# Patient Record
Sex: FEMALE | Race: BLACK OR AFRICAN AMERICAN | NOT HISPANIC OR LATINO
[De-identification: names, ages, dates, MRNs, and addresses within clinical notes are randomized per-mention and may not be internally consistent; named-entity substitution may affect disease eponyms.]

---

## 2017-02-02 ENCOUNTER — APPOINTMENT (OUTPATIENT)
Dept: CHRONIC DISEASE MANAGEMENT | Facility: CLINIC | Age: 52
End: 2017-02-02

## 2017-02-02 ENCOUNTER — RESULT CHARGE (OUTPATIENT)
Age: 52
End: 2017-02-02

## 2017-02-02 LAB — HBA1C MFR BLD HPLC: 13.9

## 2017-03-09 ENCOUNTER — APPOINTMENT (OUTPATIENT)
Dept: CHRONIC DISEASE MANAGEMENT | Facility: CLINIC | Age: 52
End: 2017-03-09

## 2017-05-11 ENCOUNTER — APPOINTMENT (OUTPATIENT)
Dept: ENDOCRINOLOGY | Facility: CLINIC | Age: 52
End: 2017-05-11

## 2017-05-11 VITALS
WEIGHT: 178 LBS | SYSTOLIC BLOOD PRESSURE: 110 MMHG | HEART RATE: 82 BPM | DIASTOLIC BLOOD PRESSURE: 70 MMHG | BODY MASS INDEX: 33.61 KG/M2 | OXYGEN SATURATION: 98 % | HEIGHT: 61 IN

## 2017-05-11 LAB
GLUCOSE BLDC GLUCOMTR-MCNC: 249
HBA1C MFR BLD HPLC: 12.9

## 2017-05-11 RX ORDER — LANCETS 33 GAUGE
EACH MISCELLANEOUS
Qty: 1 | Refills: 2 | Status: DISCONTINUED | COMMUNITY
Start: 2017-02-02 | End: 2017-05-11

## 2017-05-11 RX ORDER — BLOOD SUGAR DIAGNOSTIC
STRIP MISCELLANEOUS TWICE DAILY
Qty: 1 | Refills: 2 | Status: DISCONTINUED | COMMUNITY
Start: 2017-02-02 | End: 2017-05-11

## 2017-05-11 RX ORDER — ISOPROPYL ALCOHOL 0.7 ML/ML
SWAB TOPICAL
Qty: 2 | Refills: 3 | Status: ACTIVE | COMMUNITY
Start: 2017-05-11 | End: 1900-01-01

## 2017-05-11 RX ORDER — ASPIRIN ENTERIC COATED TABLETS 81 MG 81 MG/1
81 TABLET, DELAYED RELEASE ORAL
Qty: 1 | Refills: 5 | Status: ACTIVE | COMMUNITY
Start: 2017-05-11 | End: 1900-01-01

## 2017-05-12 RX ORDER — BLOOD SUGAR DIAGNOSTIC
STRIP MISCELLANEOUS 4 TIMES DAILY
Qty: 2 | Refills: 5 | Status: DISCONTINUED | COMMUNITY
Start: 2017-05-11 | End: 2017-05-12

## 2017-05-12 RX ORDER — LANCETS 28 GAUGE
EACH MISCELLANEOUS
Qty: 2 | Refills: 2 | Status: DISCONTINUED | COMMUNITY
Start: 2017-05-11 | End: 2017-05-12

## 2017-05-12 RX ORDER — BLOOD-GLUCOSE METER
KIT MISCELLANEOUS
Qty: 1 | Refills: 0 | Status: DISCONTINUED | COMMUNITY
Start: 2017-05-11 | End: 2017-05-12

## 2017-05-12 RX ORDER — LANCETS
EACH MISCELLANEOUS
Qty: 3 | Refills: 3 | Status: ACTIVE | COMMUNITY
Start: 2017-05-12 | End: 1900-01-01

## 2017-05-12 RX ORDER — BLOOD-GLUCOSE METER
EACH MISCELLANEOUS
Qty: 1 | Refills: 0 | Status: ACTIVE | COMMUNITY
Start: 2017-05-12 | End: 1900-01-01

## 2017-05-26 ENCOUNTER — APPOINTMENT (OUTPATIENT)
Dept: MAMMOGRAPHY | Facility: IMAGING CENTER | Age: 52
End: 2017-05-26

## 2017-05-26 ENCOUNTER — APPOINTMENT (OUTPATIENT)
Dept: ULTRASOUND IMAGING | Facility: IMAGING CENTER | Age: 52
End: 2017-05-26

## 2017-05-26 ENCOUNTER — OUTPATIENT (OUTPATIENT)
Dept: OUTPATIENT SERVICES | Facility: HOSPITAL | Age: 52
LOS: 1 days | End: 2017-05-26
Payer: MEDICAID

## 2017-05-26 DIAGNOSIS — Z00.8 ENCOUNTER FOR OTHER GENERAL EXAMINATION: ICD-10-CM

## 2017-05-26 PROCEDURE — 76642 ULTRASOUND BREAST LIMITED: CPT

## 2017-05-26 PROCEDURE — G0279: CPT

## 2017-05-26 PROCEDURE — 77065 DX MAMMO INCL CAD UNI: CPT

## 2017-06-12 ENCOUNTER — APPOINTMENT (OUTPATIENT)
Dept: ENDOCRINOLOGY | Facility: CLINIC | Age: 52
End: 2017-06-12

## 2017-06-12 VITALS
DIASTOLIC BLOOD PRESSURE: 70 MMHG | OXYGEN SATURATION: 98 % | HEIGHT: 61 IN | WEIGHT: 171 LBS | SYSTOLIC BLOOD PRESSURE: 100 MMHG | BODY MASS INDEX: 32.28 KG/M2 | HEART RATE: 95 BPM

## 2017-06-13 ENCOUNTER — APPOINTMENT (OUTPATIENT)
Dept: ULTRASOUND IMAGING | Facility: IMAGING CENTER | Age: 52
End: 2017-06-13

## 2017-06-13 ENCOUNTER — OUTPATIENT (OUTPATIENT)
Dept: OUTPATIENT SERVICES | Facility: HOSPITAL | Age: 52
LOS: 1 days | End: 2017-06-13
Payer: MEDICAID

## 2017-06-13 ENCOUNTER — RESULT REVIEW (OUTPATIENT)
Age: 52
End: 2017-06-13

## 2017-06-13 DIAGNOSIS — Z00.8 ENCOUNTER FOR OTHER GENERAL EXAMINATION: ICD-10-CM

## 2017-06-13 LAB
CHOLEST SERPL-MCNC: 192 MG/DL
CHOLEST/HDLC SERPL: 3 RATIO
HDLC SERPL-MCNC: 64 MG/DL
LDLC SERPL CALC-MCNC: 108 MG/DL
TRIGL SERPL-MCNC: 98 MG/DL

## 2017-06-13 PROCEDURE — 88360 TUMOR IMMUNOHISTOCHEM/MANUAL: CPT | Mod: 26

## 2017-06-13 PROCEDURE — 88341 IMHCHEM/IMCYTCHM EA ADD ANTB: CPT

## 2017-06-13 PROCEDURE — 88305 TISSUE EXAM BY PATHOLOGIST: CPT

## 2017-06-13 PROCEDURE — 77065 DX MAMMO INCL CAD UNI: CPT

## 2017-06-13 PROCEDURE — 88341 IMHCHEM/IMCYTCHM EA ADD ANTB: CPT | Mod: 26,59

## 2017-06-13 PROCEDURE — G0206: CPT | Mod: 26,LT

## 2017-06-13 PROCEDURE — 88342 IMHCHEM/IMCYTCHM 1ST ANTB: CPT | Mod: 26,59

## 2017-06-13 PROCEDURE — 19083 BX BREAST 1ST LESION US IMAG: CPT | Mod: LT

## 2017-06-13 PROCEDURE — 88305 TISSUE EXAM BY PATHOLOGIST: CPT | Mod: 26

## 2017-06-13 PROCEDURE — A4648: CPT

## 2017-06-13 PROCEDURE — 88342 IMHCHEM/IMCYTCHM 1ST ANTB: CPT

## 2017-06-13 PROCEDURE — 19083 BX BREAST 1ST LESION US IMAG: CPT

## 2017-06-13 PROCEDURE — 88360 TUMOR IMMUNOHISTOCHEM/MANUAL: CPT

## 2017-06-13 RX ORDER — METFORMIN HYDROCHLORIDE 500 MG/1
500 TABLET, FILM COATED, EXTENDED RELEASE ORAL
Qty: 60 | Refills: 5 | Status: DISCONTINUED | COMMUNITY
Start: 2017-06-12 | End: 2017-06-13

## 2017-06-16 LAB — SURGICAL PATHOLOGY STUDY: SIGNIFICANT CHANGE UP

## 2017-07-06 ENCOUNTER — APPOINTMENT (OUTPATIENT)
Dept: OBGYN | Facility: CLINIC | Age: 52
End: 2017-07-06

## 2017-08-08 ENCOUNTER — OUTPATIENT (OUTPATIENT)
Dept: OUTPATIENT SERVICES | Facility: HOSPITAL | Age: 52
LOS: 1 days | End: 2017-08-08
Payer: MEDICAID

## 2017-08-08 ENCOUNTER — APPOINTMENT (OUTPATIENT)
Dept: ULTRASOUND IMAGING | Facility: IMAGING CENTER | Age: 52
End: 2017-08-08
Payer: MEDICAID

## 2017-08-08 DIAGNOSIS — Z00.00 ENCOUNTER FOR GENERAL ADULT MEDICAL EXAMINATION WITHOUT ABNORMAL FINDINGS: ICD-10-CM

## 2017-08-08 PROCEDURE — 76536 US EXAM OF HEAD AND NECK: CPT | Mod: 26

## 2017-08-08 PROCEDURE — 76536 US EXAM OF HEAD AND NECK: CPT

## 2017-08-14 ENCOUNTER — APPOINTMENT (OUTPATIENT)
Dept: ENDOCRINOLOGY | Facility: CLINIC | Age: 52
End: 2017-08-14
Payer: MEDICAID

## 2017-08-14 VITALS
DIASTOLIC BLOOD PRESSURE: 86 MMHG | HEIGHT: 61 IN | WEIGHT: 179 LBS | SYSTOLIC BLOOD PRESSURE: 118 MMHG | HEART RATE: 96 BPM | OXYGEN SATURATION: 98 % | BODY MASS INDEX: 33.79 KG/M2

## 2017-08-14 LAB — GLUCOSE BLDC GLUCOMTR-MCNC: 232

## 2017-08-14 PROCEDURE — 99215 OFFICE O/P EST HI 40 MIN: CPT | Mod: 25

## 2017-08-14 PROCEDURE — 82962 GLUCOSE BLOOD TEST: CPT

## 2017-08-14 RX ORDER — DULAGLUTIDE 1.5 MG/.5ML
1.5 INJECTION, SOLUTION SUBCUTANEOUS
Qty: 1 | Refills: 3 | Status: ACTIVE | COMMUNITY
Start: 2017-06-12 | End: 1900-01-01

## 2017-08-17 LAB
CREAT SPEC-SCNC: 133 MG/DL
MICROALBUMIN 24H UR DL<=1MG/L-MCNC: 38.5 MG/DL
MICROALBUMIN/CREAT 24H UR-RTO: 289 MG/G
T4 FREE SERPL-MCNC: 1.4 NG/DL
TSH SERPL-ACNC: 1.26 UIU/ML

## 2017-08-25 ENCOUNTER — APPOINTMENT (OUTPATIENT)
Dept: OPHTHALMOLOGY | Facility: CLINIC | Age: 52
End: 2017-08-25
Payer: MEDICAID

## 2017-08-25 DIAGNOSIS — E11.9 TYPE 2 DIABETES MELLITUS W/OUT COMPLICATIONS: ICD-10-CM

## 2017-08-25 DIAGNOSIS — H25.13 AGE-RELATED NUCLEAR CATARACT, BILATERAL: ICD-10-CM

## 2017-08-25 PROCEDURE — 92004 COMPRE OPH EXAM NEW PT 1/>: CPT

## 2017-08-29 PROBLEM — H25.13 CATARACT, NUCLEAR, BILATERAL: Status: ACTIVE | Noted: 2017-08-29

## 2017-08-29 PROBLEM — E11.9 DM TYPE 2 WITHOUT RETINOPATHY: Status: ACTIVE | Noted: 2017-08-29

## 2017-09-01 ENCOUNTER — APPOINTMENT (OUTPATIENT)
Dept: PODIATRY | Facility: HOSPITAL | Age: 52
End: 2017-09-01

## 2017-12-01 ENCOUNTER — OUTPATIENT (OUTPATIENT)
Dept: OUTPATIENT SERVICES | Facility: HOSPITAL | Age: 52
LOS: 1 days | End: 2017-12-01
Payer: MEDICAID

## 2017-12-01 ENCOUNTER — APPOINTMENT (OUTPATIENT)
Dept: MAMMOGRAPHY | Facility: IMAGING CENTER | Age: 52
End: 2017-12-01
Payer: MEDICAID

## 2017-12-01 DIAGNOSIS — Z00.8 ENCOUNTER FOR OTHER GENERAL EXAMINATION: ICD-10-CM

## 2017-12-01 PROCEDURE — 77067 SCR MAMMO BI INCL CAD: CPT

## 2017-12-01 PROCEDURE — 77063 BREAST TOMOSYNTHESIS BI: CPT

## 2017-12-01 PROCEDURE — G0202: CPT | Mod: 26

## 2017-12-01 PROCEDURE — 77063 BREAST TOMOSYNTHESIS BI: CPT | Mod: 26

## 2017-12-08 ENCOUNTER — APPOINTMENT (OUTPATIENT)
Dept: MAMMOGRAPHY | Facility: IMAGING CENTER | Age: 52
End: 2017-12-08
Payer: MEDICAID

## 2017-12-08 ENCOUNTER — OUTPATIENT (OUTPATIENT)
Dept: OUTPATIENT SERVICES | Facility: HOSPITAL | Age: 52
LOS: 1 days | End: 2017-12-08
Payer: MEDICAID

## 2017-12-08 ENCOUNTER — APPOINTMENT (OUTPATIENT)
Dept: ULTRASOUND IMAGING | Facility: IMAGING CENTER | Age: 52
End: 2017-12-08
Payer: MEDICAID

## 2017-12-08 DIAGNOSIS — Z00.8 ENCOUNTER FOR OTHER GENERAL EXAMINATION: ICD-10-CM

## 2017-12-08 PROCEDURE — 76642 ULTRASOUND BREAST LIMITED: CPT | Mod: 26,LT

## 2017-12-08 PROCEDURE — G0206: CPT | Mod: 26,LT

## 2017-12-08 PROCEDURE — 77065 DX MAMMO INCL CAD UNI: CPT

## 2017-12-08 PROCEDURE — G0279: CPT | Mod: 26

## 2017-12-08 PROCEDURE — 76642 ULTRASOUND BREAST LIMITED: CPT

## 2017-12-08 PROCEDURE — G0279: CPT

## 2017-12-11 ENCOUNTER — APPOINTMENT (OUTPATIENT)
Dept: ENDOCRINOLOGY | Facility: CLINIC | Age: 52
End: 2017-12-11

## 2017-12-12 ENCOUNTER — RX RENEWAL (OUTPATIENT)
Age: 52
End: 2017-12-12

## 2017-12-13 ENCOUNTER — RX RENEWAL (OUTPATIENT)
Age: 52
End: 2017-12-13

## 2017-12-14 ENCOUNTER — RX RENEWAL (OUTPATIENT)
Age: 52
End: 2017-12-14

## 2017-12-18 ENCOUNTER — OUTPATIENT (OUTPATIENT)
Dept: OUTPATIENT SERVICES | Facility: HOSPITAL | Age: 52
LOS: 1 days | End: 2017-12-18
Payer: MEDICAID

## 2017-12-18 ENCOUNTER — RESULT REVIEW (OUTPATIENT)
Age: 52
End: 2017-12-18

## 2017-12-18 ENCOUNTER — APPOINTMENT (OUTPATIENT)
Dept: MAMMOGRAPHY | Facility: IMAGING CENTER | Age: 52
End: 2017-12-18
Payer: MEDICAID

## 2017-12-18 DIAGNOSIS — Z00.8 ENCOUNTER FOR OTHER GENERAL EXAMINATION: ICD-10-CM

## 2017-12-18 PROCEDURE — 77065 DX MAMMO INCL CAD UNI: CPT

## 2017-12-18 PROCEDURE — 19081 BX BREAST 1ST LESION STRTCTC: CPT | Mod: LT

## 2017-12-18 PROCEDURE — A4648: CPT

## 2017-12-18 PROCEDURE — G0206: CPT | Mod: 26,LT

## 2017-12-18 PROCEDURE — 19081 BX BREAST 1ST LESION STRTCTC: CPT

## 2017-12-21 DIAGNOSIS — N63.23 UNSPECIFIED LUMP IN THE LEFT BREAST, LOWER OUTER QUADRANT: ICD-10-CM

## 2017-12-21 DIAGNOSIS — R92.8 OTHER ABNORMAL AND INCONCLUSIVE FINDINGS ON DIAGNOSTIC IMAGING OF BREAST: ICD-10-CM

## 2017-12-31 ENCOUNTER — EMERGENCY (EMERGENCY)
Facility: HOSPITAL | Age: 52
LOS: 1 days | Discharge: HOME | End: 2017-12-31
Attending: EMERGENCY MEDICINE | Admitting: EMERGENCY MEDICINE
Payer: MEDICAID

## 2017-12-31 VITALS
OXYGEN SATURATION: 96 % | DIASTOLIC BLOOD PRESSURE: 89 MMHG | SYSTOLIC BLOOD PRESSURE: 165 MMHG | RESPIRATION RATE: 18 BRPM | HEART RATE: 93 BPM | TEMPERATURE: 98 F

## 2017-12-31 PROCEDURE — 99283 EMERGENCY DEPT VISIT LOW MDM: CPT

## 2017-12-31 NOTE — ED PROVIDER NOTE - OBJECTIVE STATEMENT
53 yo F with DM c/o left eye pain that she first noticed this morning. She reports the pain started in the morning with no inciting event, went away for most of the day and then returned around 5:00pm. Pt reports extreme pain and photosensitivity. Reports no trauma to the area. Denies having pain like this ever before. Denies relieving factors. Patient denies ha, loc, cp, sob, back pain, abd pain/n/v/d, urinary symptoms, recent travel and sickness. 53 yo F with DM c/o left eye pain that she first noticed this morning. She reports the pain started in the morning with no inciting event, went away for most of the day and then returned around 5:00pm. Pt reports extreme pain and photosensitivity. Reports no trauma to the area. Denies having pain like this ever before. Denies relieving factors. Patient denies ha, loc, cp, sob, back pain, abd pain/n/v/d, urinary symptoms, recent travel and sickness.       Attending note. Patient was seen in the fast track eye room.  Agree with above. Patient had some eye discomfort this morning which resolved spontaneously. Patient states her left eye pain became severe approximately 5 PM this afternoon. Patient is complaining of photophobia and pain with redness of her left eye. Patient denies any change in visual acuity. Patient denies any trauma or a chemical exposure. Patient denies any crusting the lids. Patient denies any fever. Patient wears glasses for reading only. Patient does not wear contact lenses.

## 2017-12-31 NOTE — ED PROVIDER NOTE - PHYSICAL EXAMINATION
Attending note. Patient is alert and in severe pain. Patient has severe photophobia the left eye. Patient also has been direct pain in the left side would likely she'll end in the right eye. There is no swelling of the lids. There is no crusting of the lashes. Lachrymal scar nontender. There no foreign bodies. The sclera on the left eye is injected. There is no fluorescein uptake in the cornea and no dendritic lesions.  Pupils are 3 mm equal and reactive. Anterior chamber is normal. The fundus is normal. I pressures are 10-12 mm bilaterally. Extraocular muscles are intact. There is no diplopia. Visual fields are intact to confrontation. There are no lesions on the patient's nose or forehead. Patient had some relief with tetracaine drops. Patient also had relief with Cyclogyl

## 2017-12-31 NOTE — ED PROVIDER NOTE - MEDICAL DECISION MAKING DETAILS
Pt likely with iritis, tetracaine relieved pain, no abrasions noted on slit lamp. Pressures normal in both eyes between 12-20, normal visual acuity. Significant photosensitivity reduced with cycloplegic. Will put on abx drops and ointment with close follow up with opthalmopathy and return precautions given.   Effie Pollard, PGY-1 EM Pt likely with iritis, tetracaine relieved pain, no abrasions noted on slit lamp. Pressures normal in both eyes between 12-20, normal visual acuity. Significant photosensitivity reduced with cycloplegic. Will put on abx drops and ointment with close follow up with opthalmopathy and return precautions given.   Effie Pollard, PGY-1 EM       Attending note-acute left eye pain photophobia and eye redness. Relief with tetracaine and Cyclogyl. Ofloxacin eyedrops, erythromycin ointment, followup with ophthalmology Tuesday morning.

## 2018-01-01 ENCOUNTER — OUTPATIENT (OUTPATIENT)
Dept: OUTPATIENT SERVICES | Facility: HOSPITAL | Age: 53
LOS: 1 days | End: 2018-01-01
Payer: MEDICAID

## 2018-01-01 PROCEDURE — G9001: CPT

## 2018-01-02 ENCOUNTER — APPOINTMENT (OUTPATIENT)
Dept: OPHTHALMOLOGY | Facility: CLINIC | Age: 53
End: 2018-01-02

## 2018-01-02 ENCOUNTER — OUTPATIENT (OUTPATIENT)
Dept: OUTPATIENT SERVICES | Facility: HOSPITAL | Age: 53
LOS: 1 days | End: 2018-01-02

## 2018-01-02 DIAGNOSIS — R69 ILLNESS, UNSPECIFIED: ICD-10-CM

## 2018-01-09 ENCOUNTER — RX RENEWAL (OUTPATIENT)
Age: 53
End: 2018-01-09

## 2018-01-12 DIAGNOSIS — H04.123 DRY EYE SYNDROME OF BILATERAL LACRIMAL GLANDS: ICD-10-CM

## 2018-02-09 ENCOUNTER — APPOINTMENT (OUTPATIENT)
Dept: OPHTHALMOLOGY | Facility: CLINIC | Age: 53
End: 2018-02-09

## 2018-03-12 ENCOUNTER — APPOINTMENT (OUTPATIENT)
Dept: OPHTHALMOLOGY | Facility: CLINIC | Age: 53
End: 2018-03-12

## 2018-04-03 ENCOUNTER — APPOINTMENT (OUTPATIENT)
Dept: OPHTHALMOLOGY | Facility: CLINIC | Age: 53
End: 2018-04-03

## 2018-04-03 ENCOUNTER — APPOINTMENT (OUTPATIENT)
Dept: OPHTHALMOLOGY | Facility: CLINIC | Age: 53
End: 2018-04-03
Payer: MEDICAID

## 2018-04-03 PROCEDURE — 92015 DETERMINE REFRACTIVE STATE: CPT

## 2018-04-03 PROCEDURE — 92014 COMPRE OPH EXAM EST PT 1/>: CPT | Mod: 25

## 2018-04-03 PROCEDURE — 92134 CPTRZ OPH DX IMG PST SGM RTA: CPT

## 2018-04-03 PROCEDURE — 68761 CLOSE TEAR DUCT OPENING: CPT | Mod: E2,E4

## 2018-04-12 RX ORDER — ISOPROPYL ALCOHOL 0.7 ML/1
SWAB TOPICAL
Qty: 2 | Refills: 6 | Status: ACTIVE | COMMUNITY
Start: 2018-04-12 | End: 1900-01-01

## 2018-05-22 ENCOUNTER — RX RENEWAL (OUTPATIENT)
Age: 53
End: 2018-05-22

## 2018-05-22 RX ORDER — BLOOD SUGAR DIAGNOSTIC
STRIP MISCELLANEOUS 3 TIMES DAILY
Qty: 300 | Refills: 0 | Status: ACTIVE | COMMUNITY
Start: 2017-05-12 | End: 1900-01-01

## 2018-05-22 RX ORDER — LANCETS 30 GAUGE
EACH MISCELLANEOUS
Qty: 300 | Refills: 0 | Status: ACTIVE | COMMUNITY
Start: 2018-05-22 | End: 1900-01-01

## 2018-06-25 ENCOUNTER — APPOINTMENT (OUTPATIENT)
Dept: OPHTHALMOLOGY | Facility: CLINIC | Age: 53
End: 2018-06-25
Payer: MEDICAID

## 2018-06-25 DIAGNOSIS — E08.3313: ICD-10-CM

## 2018-06-25 DIAGNOSIS — H25.13 AGE-RELATED NUCLEAR CATARACT, BILATERAL: ICD-10-CM

## 2018-06-25 DIAGNOSIS — H04.123 DRY EYE SYNDROME OF BILATERAL LACRIMAL GLANDS: ICD-10-CM

## 2018-06-25 PROCEDURE — 92014 COMPRE OPH EXAM EST PT 1/>: CPT

## 2018-06-25 PROCEDURE — 92134 CPTRZ OPH DX IMG PST SGM RTA: CPT

## 2018-06-29 ENCOUNTER — RX RENEWAL (OUTPATIENT)
Age: 53
End: 2018-06-29

## 2018-09-03 ENCOUNTER — EMERGENCY (EMERGENCY)
Facility: HOSPITAL | Age: 53
LOS: 1 days | Discharge: HOME | End: 2018-09-03
Attending: EMERGENCY MEDICINE
Payer: MEDICAID

## 2018-09-03 VITALS
TEMPERATURE: 98 F | WEIGHT: 160.06 LBS | DIASTOLIC BLOOD PRESSURE: 88 MMHG | RESPIRATION RATE: 20 BRPM | OXYGEN SATURATION: 99 % | SYSTOLIC BLOOD PRESSURE: 163 MMHG | HEART RATE: 83 BPM | HEIGHT: 62 IN

## 2018-09-03 PROCEDURE — 99284 EMERGENCY DEPT VISIT MOD MDM: CPT

## 2018-09-03 PROCEDURE — 99283 EMERGENCY DEPT VISIT LOW MDM: CPT

## 2018-09-03 RX ORDER — CYCLOPENTOLATE HYDROCHLORIDE 10 MG/ML
1 SOLUTION/ DROPS OPHTHALMIC ONCE
Refills: 0 | Status: DISCONTINUED | OUTPATIENT
Start: 2018-09-03 | End: 2018-09-07

## 2018-09-03 RX ORDER — PREDNISOLONE SODIUM PHOSPHATE 1 %
2 DROPS OPHTHALMIC (EYE)
Qty: 1 | Refills: 0
Start: 2018-09-03 | End: 2018-09-07

## 2018-09-03 RX ORDER — CYCLOPENTOLATE HYDROCHLORIDE 10 MG/ML
2 SOLUTION/ DROPS OPHTHALMIC
Qty: 1 | Refills: 0
Start: 2018-09-03 | End: 2018-09-07

## 2018-09-03 RX ORDER — PREDNISOLONE SODIUM PHOSPHATE 1 %
2 DROPS OPHTHALMIC (EYE) ONCE
Refills: 0 | Status: DISCONTINUED | OUTPATIENT
Start: 2018-09-03 | End: 2018-09-07

## 2018-09-03 NOTE — ED PROVIDER NOTE - PHYSICAL EXAMINATION
General: Alert and Orientated x 3. No apparent distress.  Head: Normocephalic and atraumatic.  Eyes: R eye: injected, 5mm pupil, non-reactive to light or consentual. L eye nl reactive to light, no consensual  Cardiac: Normal S1 and S2 w/ RRR. No murmurs appreciated. No JVD appreciated.  Pulmonary: Vesicular breath sounds bilaterally. No increased WOB. No wheezes or crackles.  Abdominal: Soft, non-tender. (+) bowel sounds appreciated in all 4 quadrants. No hepatosplenomegaly.   Neurologic: No focal sensory or motor deficits.  Musculoskeletal: Strength appropriate in all 4 extremities for age with no limited ROM.  Skin: Color appropriate for race. Intact, warm, and well-perfused.  Lymphatic: No cervical or inguinal adenopathy appreciated.  Psychiatric: Appropriate mood and affect. No apparent risk to self or others.  Curtis Calvo, PGY-1 General: Alert and Orientated x 3.   Head: Normocephalic and atraumatic.  Eyes: R eye: injected, 5mm pupil, non-reactive to light or consentual. L eye nl reactive to light, no consensual. no abrasions seen on Fluorescein.   Cardiac: Normal S1 and S2 w/ RRR. No murmurs appreciated. No JVD appreciated.  Pulmonary: Vesicular breath sounds bilaterally. No increased WOB. No wheezes or crackles.  Abdominal: Soft, non-tender. (+) bowel sounds appreciated in all 4 quadrants. No hepatosplenomegaly.   Neurologic: No focal sensory or motor deficits.  Musculoskeletal: Strength appropriate in all 4 extremities for age with no limited ROM.  Skin: Color appropriate for race. Intact, warm, and well-perfused.  Lymphatic: No cervical or inguinal adenopathy appreciated.  Psychiatric: Appropriate mood and affect. No apparent risk to self or others.  Curtis Calvo, PGY-1 General: Alert and Orientated x 3.   Head: Normocephalic and atraumatic.  Eyes: R eye: injected, 5mm pupil, non-reactive to light or consentual. L eye nl reactive to light, no consensual. no abrasions seen on Fluorescein. Vision 20/50 in both eyes.   Cardiac: Normal S1 and S2 w/ RRR. No murmurs appreciated. No JVD appreciated.  Pulmonary: Vesicular breath sounds bilaterally. No increased WOB. No wheezes or crackles.  Abdominal: Soft, non-tender. (+) bowel sounds appreciated in all 4 quadrants. No hepatosplenomegaly.   Neurologic: No focal sensory or motor deficits.  Musculoskeletal: Strength appropriate in all 4 extremities for age with no limited ROM.  Skin: Color appropriate for race. Intact, warm, and well-perfused.  Lymphatic: No cervical or inguinal adenopathy appreciated.  Psychiatric: Appropriate mood and affect. No apparent risk to self or others.  Curtis Calvo, PGY-1

## 2018-09-03 NOTE — ED PROVIDER NOTE - MEDICAL DECISION MAKING DETAILS
43 y/o F PMH diabetes presenting after trauma to L eye. Eye is injected, pupil fixed and dilated. DDX: glaucoma, corneal abrasion, traumatic mydriasis.

## 2018-09-03 NOTE — CONSULT NOTE ADULT - SUBJECTIVE AND OBJECTIVE BOX
[facility] Ophthalmology Consult Note    HPI: 53 y/o F, PMH DMT2, presents to [facility] s/p right eye trauma that occured yesterday afternoon. Patient was playing with her grandson when she was accidentally struck in the right eye. She initially felt well after the incident, however this morning experienced increasing pain, redness, tearing, and irritation. She denies change in VA, photophobia, flashes/floaters. This is the first time she has suffered trauma to the eye. Otherwise denies nausea, vomiting.      PMH: DMT2, HLD  Meds: Metformin, Glipizide  POcHx (including surgeries/lasers/trauma):  None  Drops: ART QId  FamHx: Glaucoma in farther  Social Hx: None  Allergies: NKDA    ROS:  General (neg), Vision (per HPI), Head and Neck (neg), Pulm (neg), CV (neg), GI (neg),  (neg), Musculoskeletal (neg), Skin/Integ (neg), Neuro (neg), Endocrine (neg), Heme (neg), All/Immuno (neg)    Mood and Affect Appropriate ( x ),  Oriented to Time, Place, and Person x 3 ( x )    Ophthalmology Exam    Visual acuity (sc): 20/20 -1  OU  Pupils: 4mm dilated pupil OD, R+R OS  Ttono: 15, 16  Extraocular movements (EOMs): Full OU, no pain, no diplopia  Confrontational Visual Field (CVF):  Full OU      Slit Lamp Exam (SLE)  External:  Flat OU  Lids/Lashes/Lacrimal Ducts: Trace edema upper eyelid OD   Sclera/Conjunctiva:  W+Q OU  Cornea: Cl OU  Anterior Chamber: 2+ cell and flare OD  Iris:  No tears seen OD  Lens:  Cl OU    Fundus Exam: dilated with 1% tropicamide and 2.5% phenylephrine  Approval obtained from primary team for dilation  Patient aware that pupils can remained dilated for at least 4-6 hours  Exam performed with 20D lens    Vitreous: wnl OU  Disc, cup/disc: sharp and pink, 0.4 OU  Macula:  wnl OU  Vessels:  wnl OU  Periphery: wnl OU    Diagnostic Testing:    Assessment: 53 y/o F, PMH DMT2 HLD, presents after being struck in the right eye, c/o 1 day hx of pain, redness, and tearing. Visual acuity 20/20 OU, pressures WNL, 2+ cell/flare seen in right anterior chamber. Posterior segment exam otherwise unremarkable. Pupil dilation likely 2/2 to inflammation of the right eye. 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       Plan:  - cyclogyl BID right eye  - Pred forte drops QID right eye  - F/u 600 clinic tomorrow, patient given card.    D/W Dr. Damon.    Follow-Up:  Patient should follow up his/her ophthalmologist or in the [facility] within 1 week of discharge  [address] [place] [address] [phone]

## 2018-09-03 NOTE — ED PROVIDER NOTE - OBJECTIVE STATEMENT
53 y/o F PMH diabetes c/o R eye pain. Pt reports yesterday she was playing with her grandson when he accidently hit her in the eye with his hand 53 y/o F PMH diabetes c/o R eye pain. Pt reports yesterday she was playing with her grandson when he accidently hit her in the eye with his hand. initially there was some mild discomfort but pt was out of town so did not come in. this AM woke up with severe eye pain. Worse with movement, some radiation to forehead. No change in vision, no nausea/vomiting. No CP/SOB.

## 2018-09-03 NOTE — ED PROVIDER NOTE - PLAN OF CARE
You were seen in the emergency department for eye pain. You had an eye exam, and we believe you have traumatic iritis. Please follow up with ophthalmology in the next 24hours. Return to the emergency department immediately if you experience changes in vision, nausea vomiting or any other concerning symptoms.  Please use cyclogyl 2 drops, 2 times a day, and pred forte 2 drops 4 times a day for the next 5 days, or as instructed by ophthalmology.

## 2018-09-03 NOTE — ED ADULT TRIAGE NOTE - CHIEF COMPLAINT QUOTE
pt states accidently hit to right eye by grandsons hand pt states uriah gonzalez previous pain problem to this eye pt sclera reddend and painful watery

## 2018-09-03 NOTE — ED PROVIDER NOTE - PROGRESS NOTE DETAILS
spoke with beverly who agreed to come and see pt spoke with beverly who agreed to come and see pt  Curtis Calvo, PGY-1 Charge nurse notified, pt will be given medication from pharmacy before being discharged.  Curtis Calvo, PGY-1

## 2018-09-03 NOTE — ED PROVIDER NOTE - NS ED ROS FT
Constitution: No Fever or chills  Eyes: + eye pain, no visual changes  HEENT: No URI symptoms  Cardio: No Chest pain  Resp: No SOB  GI: No abdominal pain  : No dysuria  MSK: No Back pain  Neuro: No Headache  Skin: No rashes  Curtis Calvo, PGY-1

## 2018-09-03 NOTE — ED PROVIDER NOTE - CARE PLAN
Principal Discharge DX:	Traumatic iritis  Assessment and plan of treatment:	You were seen in the emergency department for eye pain. You had an eye exam, and we believe you have traumatic iritis. Please follow up with ophthalmology in the next 24hours. Return to the emergency department immediately if you experience changes in vision, nausea vomiting or any other concerning symptoms.  Please use cyclogyl 2 drops, 2 times a day, and pred forte 2 drops 4 times a day for the next 5 days, or as instructed by ophthalmology.

## 2018-09-03 NOTE — ED ADULT NURSE REASSESSMENT NOTE - NS ED NURSE REASSESS COMMENT FT1
1620 pt returned form the eye room and pt to be d/c and given eye drops pt does not want to wait for the pharmacy and hers is closed and pt does want to sent to [facility] Pt was asked to wait in the eye room for pt privacy for she was standing at the desk while the MD and rn talking and discussing other pt issues.  pt was asked a second time and pt became nasty and yelling at rn Apolinar neely witnessed this incident  Pt was yelling [place] asked pt to lower her voice  and Told pt that security would be called and she became more belligerent.  Manager Andrade and administration  Myriam came to witness the pt yelling.  Pt stated she did not want to wait for the meds form our pharmacy.at 1615  meds were ordered at 1545 and pt at 1615 stated she would not wait she wanted to leave and she will come back later tonight to get the meds Andrade wallace d/c and discussed the d/c inatructions joy

## 2018-09-03 NOTE — ED PROVIDER NOTE - ATTENDING CONTRIBUTION TO CARE
Dr. Sims : I have personally seen and examined this patient at the bedside. I have fully participated in the care of this patient. I have reviewed all pertinent clinical information, including history, physical exam, plan and the Resident's note and agree except as noted.     53 yo F PMH diabetes pw R eye pain sp getting hit by her grandson hand while playing. no change in vision. Denies f/c/n/v/cp/sob/palpitations/cough/abd.pain/d/c/dysuria/hematuria. no sick contacts/recent travel.    PE:  head; atraumatic normocephalic  eyes: perrla eomi right eye conjunctival erythema neg flurouscene exam vision  20/50 in both eyes  Heart: rrr s1s2  lungs: ctab  abd: soft, nt nd + bs no rebound/guarding no cva ttp      -->seen by optho most likely traumatic iritis will tx with drops--dc with optho follow up

## 2018-10-09 ENCOUNTER — APPOINTMENT (OUTPATIENT)
Dept: MAMMOGRAPHY | Facility: CLINIC | Age: 53
End: 2018-10-09
Payer: MEDICAID

## 2018-10-09 ENCOUNTER — APPOINTMENT (OUTPATIENT)
Dept: ULTRASOUND IMAGING | Facility: CLINIC | Age: 53
End: 2018-10-09
Payer: MEDICAID

## 2018-10-09 ENCOUNTER — OUTPATIENT (OUTPATIENT)
Dept: OUTPATIENT SERVICES | Facility: HOSPITAL | Age: 53
LOS: 1 days | End: 2018-10-09
Payer: MEDICAID

## 2018-10-09 DIAGNOSIS — Z00.8 ENCOUNTER FOR OTHER GENERAL EXAMINATION: ICD-10-CM

## 2018-10-09 PROCEDURE — 76641 ULTRASOUND BREAST COMPLETE: CPT | Mod: 26,50

## 2018-10-09 PROCEDURE — 77066 DX MAMMO INCL CAD BI: CPT | Mod: 26

## 2018-10-09 PROCEDURE — G0279: CPT | Mod: 26

## 2018-10-09 PROCEDURE — 76641 ULTRASOUND BREAST COMPLETE: CPT

## 2018-10-09 PROCEDURE — G0279: CPT

## 2018-10-09 PROCEDURE — 77066 DX MAMMO INCL CAD BI: CPT

## 2018-10-24 ENCOUNTER — RX RENEWAL (OUTPATIENT)
Age: 53
End: 2018-10-24

## 2018-10-24 RX ORDER — METFORMIN HYDROCHLORIDE 500 MG/1
500 TABLET, COATED ORAL
Qty: 60 | Refills: 0 | Status: ACTIVE | COMMUNITY
Start: 2017-06-13 | End: 1900-01-01

## 2018-12-10 ENCOUNTER — APPOINTMENT (OUTPATIENT)
Dept: OPHTHALMOLOGY | Facility: CLINIC | Age: 53
End: 2018-12-10

## 2019-03-13 ENCOUNTER — APPOINTMENT (OUTPATIENT)
Dept: OBGYN | Facility: CLINIC | Age: 54
End: 2019-03-13
Payer: MEDICAID

## 2019-03-13 ENCOUNTER — OUTPATIENT (OUTPATIENT)
Dept: OUTPATIENT SERVICES | Facility: HOSPITAL | Age: 54
LOS: 1 days | End: 2019-03-13
Payer: MEDICAID

## 2019-03-13 VITALS
BODY MASS INDEX: 33.42 KG/M2 | SYSTOLIC BLOOD PRESSURE: 140 MMHG | HEIGHT: 61 IN | WEIGHT: 177 LBS | DIASTOLIC BLOOD PRESSURE: 98 MMHG

## 2019-03-13 DIAGNOSIS — Z00.00 ENCOUNTER FOR GENERAL ADULT MEDICAL EXAMINATION W/OUT ABNORMAL FINDINGS: ICD-10-CM

## 2019-03-13 DIAGNOSIS — N76.0 ACUTE VAGINITIS: ICD-10-CM

## 2019-03-13 DIAGNOSIS — Z86.19 PERSONAL HISTORY OF OTHER INFECTIOUS AND PARASITIC DISEASES: ICD-10-CM

## 2019-03-13 DIAGNOSIS — Z01.419 ENCOUNTER FOR GYNECOLOGICAL EXAMINATION (GENERAL) (ROUTINE) W/OUT ABNORMAL FINDINGS: ICD-10-CM

## 2019-03-13 PROCEDURE — 87624 HPV HI-RISK TYP POOLED RSLT: CPT

## 2019-03-13 PROCEDURE — 99396 PREV VISIT EST AGE 40-64: CPT | Mod: NC

## 2019-03-13 PROCEDURE — G0463: CPT

## 2019-03-13 RX ORDER — FLUCONAZOLE 150 MG/1
150 TABLET ORAL
Qty: 2 | Refills: 0 | Status: ACTIVE | COMMUNITY
Start: 2019-03-13 | End: 1900-01-01

## 2019-03-13 RX ORDER — CLOTRIMAZOLE 2 G/100G
2 CREAM VAGINAL
Qty: 1 | Refills: 0 | Status: ACTIVE | COMMUNITY
Start: 2019-03-13 | End: 1900-01-01

## 2019-03-18 NOTE — PHYSICAL EXAM
[Awake] : awake [Alert] : alert [Soft] : soft [Oriented x3] : oriented to person, place, and time [Labia Majora Erythema] : erythema of the labia majora [Normal] : uterus [Atrophy] : atrophy [Discharge] : a  ~M vaginal discharge was present [Scant] : scant [White] : white [Thick] : thick [No Bleeding] : there was no active vaginal bleeding [Pap Obtained] : a Pap smear was performed [Anteversion] : anteverted [Uterine Adnexae] : were not tender and not enlarged [Acute Distress] : no acute distress [LAD] : no lymphadenopathy [Thyroid Nodule] : no thyroid nodule [Goiter] : no goiter [Mass] : no breast mass [Nipple Discharge] : no nipple discharge [Axillary LAD] : no axillary lymphadenopathy [Tender] : non tender [Tenderness] : nontender [Enlarged ___ wks] : not enlarged

## 2019-03-21 DIAGNOSIS — Z01.419 ENCOUNTER FOR GYNECOLOGICAL EXAMINATION (GENERAL) (ROUTINE) WITHOUT ABNORMAL FINDINGS: ICD-10-CM

## 2020-12-21 PROBLEM — Z86.19 HISTORY OF CANDIDIASIS OF VAGINA: Status: RESOLVED | Noted: 2019-03-13 | Resolved: 2020-12-21

## 2023-06-20 NOTE — ED ADULT NURSE NOTE - DOES PATIENT HAVE ADVANCE DIRECTIVE
unknown PAST SURGICAL HISTORY:  History of arthroscopy of right knee     Pilonidal cyst removal 1983    S/P D&C (status post dilation and curettage) 1993